# Patient Record
Sex: MALE | Race: BLACK OR AFRICAN AMERICAN
[De-identification: names, ages, dates, MRNs, and addresses within clinical notes are randomized per-mention and may not be internally consistent; named-entity substitution may affect disease eponyms.]

---
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
## 2018-02-17 ENCOUNTER — HOSPITAL ENCOUNTER (EMERGENCY)
Dept: HOSPITAL 62 - ER | Age: 18
Discharge: HOME | End: 2018-02-17
Payer: COMMERCIAL

## 2018-02-17 VITALS — SYSTOLIC BLOOD PRESSURE: 106 MMHG | DIASTOLIC BLOOD PRESSURE: 70 MMHG

## 2018-02-17 DIAGNOSIS — S63.91XA: Primary | ICD-10-CM

## 2018-02-17 DIAGNOSIS — S63.501A: ICD-10-CM
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
## 2018-02-17 PROCEDURE — 73130 X-RAY EXAM OF HAND: CPT

## 2018-02-17 PROCEDURE — 99283 EMERGENCY DEPT VISIT LOW MDM: CPT

## 2018-02-17 PROCEDURE — 73110 X-RAY EXAM OF WRIST: CPT

## 2018-02-17 PROCEDURE — L3908 WHO COCK-UP NONMOLDE PRE OTS: HCPCS

## 2018-02-17 NOTE — RADIOLOGY REPORT (SQ)
EXAM DESCRIPTION:  HAND RIGHT 3 VIEWS; WRIST RIGHT 3 VIEWS



COMPLETED DATE/TIME:  2/17/2018 7:12 pm



REASON FOR STUDY:  fell on roof, +wrist and hand pain



COMPARISON:  See below.



FINDINGS:  Three views right wrist:  2016 comparisons.  Normal alignment.  Normal bone density.

Three views right hand:  Soft tissue swelling distally along the 5th metacarpal.  No radiopaque forei
gn body or underlying fracture.  No subluxation or dislocation evident.



TECHNICAL DOCUMENTATION:  JOB ID:  [id number]

## 2018-02-17 NOTE — ER DOCUMENT REPORT
HPI





- HPI


Patient complains to provider of: Right hand injury


Onset: This afternoon


Onset/Duration: Sudden


Quality of pain: Achy


Pain Level: 5


Context: 





Patient states that he was working on a roof and fell off of the roof injuring 

his right hand.  Patient complains of pain over right fifth metacarpal and into 

the right lateral wrist area.  Patient is right-hand dominant.  Patient reports 

abrasions to the right hand.  Tetanus immunizations currently up-to-date.  

Patient denies any head injury, loss of consciousness, chest neck or back pain.


Associated Symptoms: Other - Right hand injury


Exacerbated by: Movement


Relieved by: Denies


Similar symptoms previously: No


Recently seen / treated by doctor: No





- ROS


ROS below otherwise negative: Yes


Systems Reviewed and Negative: Yes All other systems reviewed and negative





- NEURO


Neurology: DENIES: Headache





- CARDIOVASCULAR


Cardiovascular: DENIES: Chest pain





- RESPIRATORY


Respiratory: DENIES: Trouble Breathing





- GASTROINTESTINAL


Gastrointestinal: DENIES: Abdominal Pain, Nausea, Patient vomiting





- MUSCULOSKELETAL


Musculoskeletal: REPORTS: Extremity pain.  DENIES: Back Pain





- DERM


Skin Color: Normal


Skin Problems: Abrasion





Past Medical History





- General


Information source: Patient





- Social History


Smoking Status: Never Smoker


Frequency of alcohol use: None


Drug Abuse: None


Occupation: 


Lives with: Family


Family History: Reviewed & Not Pertinent





- Medical History


Medical History: Negative


Renal/ Medical History: Denies: Hx Peritoneal Dialysis


Surgical Hx: Negative





- Immunizations


Immunizations up to date: Yes


Hx Diphtheria, Pertussis, Tetanus Vaccination: Yes





Vertical Provider Document





- CONSTITUTIONAL


Agree With Documented VS: Yes


Exam Limitations: No Limitations


General Appearance: WD/WN, No Apparent Distress





- INFECTION CONTROL


TRAVEL OUTSIDE OF THE [place] IN LAST 30 DAYS: No





- HEENT


HEENT: Atraumatic, Normocephalic





- NECK


Neck: Normal Inspection





- RESPIRATORY


Respiratory: Breath Sounds Normal, No Respiratory Distress


O2 Sat by Pulse Oximetry: 99





- CARDIOVASCULAR


Cardiovascular: Regular Rate, Regular Rhythm


Pulses: Normal: Radial





- MUSCULOSKELETAL/EXTREMETIES


Musculoskeletal/Extremeties: MAEW, Tender - Right hand tenderness over right 

fifth metacarpal, right wrist tenderness to ulnar aspect of right wrist, no 

ecchymosis edema or deformity, No Edema.  negative: Eccymosis





- NEURO


Level of Consciousness: Awake, Alert, Appropriate


Motor/Sensory: No Motor Deficit





- DERM


Integumentary: Warm, Dry


Notes: 





Abrasions to dorsal aspect of right hand





Course





- Vital Signs


Vital signs: 


 











Temp Pulse Resp BP Pulse Ox


 


 98.8 F   82   18   107/72   99 


 


 02/17/18 18:22  02/17/18 18:22  02/17/18 18:22  02/17/18 18:22  02/17/18 18:22














- Diagnostic Test


Radiology reviewed: Image reviewed, Reports reviewed





Procedures





- Immobilization


  ** Right Hand


Pre-Proc Neuro Vasc Exam: Normal


Immobilizer type: Cock-up


Performed by: PCT


Post-Proc Neuro Vasc Exam: Normal


Alignment checked and good: Yes





Discharge





- Discharge


Clinical Impression: 


Hand sprain


Qualifiers:


 Encounter type: initial encounter Laterality: right Qualified Code(s): 

S63.91XA - Sprain of unspecified part of right wrist and hand, initial encounter





Wrist sprain


Qualifiers:


 Encounter type: initial encounter Laterality: right Qualified Code(s): 

S63.501A - Unspecified sprain of right wrist, initial encounter





Condition: Stable


Disposition: HOME, SELF-CARE


Instructions:  Ice & Elevation ([facility]), Sprain ([facility]), Wrist Sprain ([facility]), 

Temporary Splint ([facility])


Additional Instructions: 


Return immediately for any new or worsening symptoms





Followup with your primary care provider, call tomorrow to make a followup 

appointment








Prescriptions: 


Naproxen [Naprosyn 250 Nmg Tablet] 1 tab PO BID #14 tablet


Forms:  Return to Work


Referrals: 


MARIBETH BACK DO [Primary Care Provider] - Follow up as needed


[facility] [Provider Group] - Follow up as needed

## 2018-02-17 NOTE — RADIOLOGY REPORT (SQ)
EXAM DESCRIPTION:  HAND RIGHT 3 VIEWS; WRIST RIGHT 3 VIEWS



COMPLETED DATE/TIME:  2/17/2018 7:12 pm



REASON FOR STUDY:  fell on roof, +wrist and hand pain



COMPARISON:  See below.



FINDINGS:  Three views right wrist:  2016 comparisons.  Normal alignment.  Normal bone density.

Three views right hand:  Soft tissue swelling distally along the 5th metacarpal.  No radiopaque forei
gn body or underlying fracture.  No subluxation or dislocation evident.



TECHNICAL DOCUMENTATION:  JOB ID:  [id number]

## 2018-03-20 ENCOUNTER — HOSPITAL ENCOUNTER (EMERGENCY)
Dept: HOSPITAL 62 - ER | Age: 18
Discharge: HOME | End: 2018-03-20
Payer: SELF-PAY

## 2018-03-20 VITALS — DIASTOLIC BLOOD PRESSURE: 65 MMHG | SYSTOLIC BLOOD PRESSURE: 123 MMHG
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
## 2018-03-20 DIAGNOSIS — S60.151A: Primary | ICD-10-CM

## 2018-03-20 DIAGNOSIS — F17.210: ICD-10-CM

## 2018-03-20 PROCEDURE — 99406 BEHAV CHNG SMOKING 3-10 MIN: CPT

## 2018-03-20 PROCEDURE — 99283 EMERGENCY DEPT VISIT LOW MDM: CPT

## 2018-03-20 NOTE — RADIOLOGY REPORT (SQ)
EXAM DESCRIPTION:  HAND RIGHT 3 VIEWS



COMPLETED DATE/TIME:  3/20/2018 6:14 pm



REASON FOR STUDY:  finger pain



COMPARISON:  2/17/2018



EXAM PARAMETERS:  NUMBER OF VIEWS: Three views.

TECHNIQUE: AP, lateral and oblique  radiographic images acquired of the right hand.

LIMITATIONS: None.



FINDINGS:  MINERALIZATION: Normal.

BONES: No acute fracture or dislocation.  No worrisome bone lesions.

JOINTS: No effusions.

SOFT TISSUES: There is soft tissue swelling over the 5th metacarpophalangeal joint.  There are no ass
ociated calcifications or joint erosions.

OTHER: No other significant finding.



IMPRESSION:  Soft tissue swelling as described.  Is there any clinical evidence of gout?



TECHNICAL DOCUMENTATION:  JOB ID:  [id number]

 2011 [email or web address]- All Rights Reserved



Reading location - IP/workstation name: [email or web address]

## 2018-03-20 NOTE — ER DOCUMENT REPORT
ED Hand/Wrist Injury





- General


Chief Complaint: Finger Injury


Stated Complaint: FINGER INJURY


Time Seen by Provider: 03/20/18 18:24


Mode of Arrival: Ambulatory


Information source: Patient


Notes: 





16-year-old male presents to ED for complaint of pain to the right fifth 

finger.  He states he punched a wall yesterday.  He states he fell off a roof 

on 2/17/18.


TRAVEL OUTSIDE OF THE [place] IN LAST 30 DAYS: No





- HPI


Injury to: Small finger


Onset: Yesterday


Where: Home, Indoors


Timing: Still present


Quality of pain: Achy, Throbbing


Severity: Moderate


Pain Level: 4


Context: Other - Punched a wall





- Related Data


Allergies/Adverse Reactions: 


 





No Known Allergies Allergy (Verified 03/20/18 17:44)


 











Past Medical History





- General


Information source: Patient





- Social History


Smoking Status: Current Every Day Smoker


Cigarette use (# per day): Yes - 2 cigarettes a day


Chew tobacco use (# tins/day): No


Smoking Education Provided: Yes - 4 minutes


Frequency of alcohol use: None


Drug Abuse: None


Occupation: 


Lives with: Spouse/Significant other


Family History: Reviewed & Not Pertinent.  denies: Arthritis, CAD, COPD, CVA, DM

, Hyperlipidemia, Hypertension, Malignancy, Thyroid Disfunction


Patient has suicidal ideation: No


Patient has homicidal ideation: No





- Past Medical History


Cardiac Medical History: Reports: None


Pulmonary Medical History: Reports: None


EENT Medical History: Reports: None


Neurological Medical History: Reports: None


Endocrine Medical History: Reports: None


Renal/ Medical History: Reports: None


Malignancy Medical History: Reports None


GI Medical History: Reports: None


Musculoskeltal Medical History: Reports Hx Musculoskeletal Trauma


Skin Medical History: Reports None


Psychiatric Medical History: Reports: None


Traumatic Medical History: Reports: None


Infectious Medical History: Reports: None


Surgical Hx: Negative


Past Surgical History: Reports: None





- Immunizations


Immunizations up to date: Yes


Hx Diphtheria, Pertussis, Tetanus Vaccination: Yes





Review of Systems





- Review of Systems


Constitutional: No symptoms reported


EENT: No symptoms reported


Cardiovascular: No symptoms reported


Respiratory: No symptoms reported


Gastrointestinal: No symptoms reported


Genitourinary: No symptoms reported


Male Genitourinary: No symptoms reported


Musculoskeletal: Other - Right fifth finger pain swelling


Skin: No symptoms reported


Hematologic/Lymphatic: No symptoms reported


Neurological/Psychological: No symptoms reported


-: Yes All other systems reviewed and negative





Physical Exam





- Vital signs


Vitals: 


 











Temp Pulse Resp BP Pulse Ox


 


 98.7 F   74   14   123/65   99 


 


 03/20/18 17:55  03/20/18 17:55  03/20/18 17:55  03/20/18 17:55  03/20/18 17:55











Interpretation: Normal





- General


General appearance: Appears well, Alert





- HEENT


Head: Normocephalic, Atraumatic


Eyes: Normal


Pupils: PERRL





- Respiratory


Respiratory status: No respiratory distress


Chest status: Nontender


Breath sounds: Normal


Chest palpation: Normal





- Cardiovascular


Rhythm: Regular


Heart sounds: Normal auscultation


Murmur: No





- Abdominal


Inspection: Normal


Distension: No distension


Bowel sounds: Normal


Tenderness: Nontender


Organomegaly: No organomegaly





- Back


Back: Normal, Nontender





- Extremities


General upper extremity: Normal color, Normal temperature


General lower extremity: Normal inspection, Nontender, Normal color, Normal ROM

, Normal temperature, Normal weight bearing.  No: Jonathan's sign


Hand: Tender, Ecchymosis, No evidence of human bite, No evidence of FB - Right 

fifth finger, Swelling.  No: Abrasion, Deformity, Dislocation, Instability, 

Laceration





- Neurological


Neuro grossly intact: Yes


Cognition: Normal


Orientation: AAOx4


Monroe Coma Scale Eye Opening: Spontaneous


Michele Coma Scale Verbal: Oriented


Monroe Coma Scale Motor: Obeys Commands


Monroe Coma Scale Total: 15


Speech: Normal


Motor strength normal: LUE, RUE, LLE, RLE


Sensory: Normal





- Psychological


Associated symptoms: Normal affect, Normal mood





- Skin


Skin Temperature: Warm


Skin Moisture: Dry


Skin Color: Normal





Course





- Vital Signs


Vital signs: 


 











Temp Pulse Resp BP Pulse Ox


 


 98.7 F   74   14   123/65   99 


 


 03/20/18 17:55  03/20/18 17:55  03/20/18 17:55  03/20/18 17:55  03/20/18 17:55














- Diagnostic Test


Radiology reviewed: Image reviewed, Reports reviewed





Discharge





- Discharge


Clinical Impression: 


Contusion of right little finger with damage to nail


Qualifiers:


 Encounter type: initial encounter Qualified Code(s): S60.151A - Contusion of 

right little finger with damage to nail, initial encounter





Condition: Stable


Disposition: HOME, SELF-CARE


Instructions:  Family Physicians / Practices


Additional Instructions: 


CONTUSION:


    Your injury has resulted in a contusion -- a crushing of the deep tissues.  

No injury to important structures was detected during the physician's exam.  

Contusions vary in the amount of pain they cause, and in the length of time 

required for healing.  Typically, the area will become bruised, and will remain 

painful to touch for two or three weeks.  However, most patients are back to 

working and playing within a few days.


     After the initial period of rest and cold-packs, your symptoms (together 

with the doctor's recommendations) will determine how rapidly you can get back 

to full activity.  Usually this means "do what feels okay, but don't do things 

that hurt."


     If re-examination was recommended, it's important to follow up as 

instructed.  Call the doctor or return any time if pain increases, if swelling 

becomes severe, if you develop numbness or weakness in an injured extremity, or 

if any other alarming symptoms occur.








USE OF TYLENOL (ACETAMINOPHEN):


     Acetaminophen may be taken for pain relief or fever control. It's much 

safer than aspirin, offering a wider range of "safe" dosages.  It is safe 

during pregnancy.  Some brand names are Tylenol, Panadol, Datril, Anacin 3, 

Tempra, and Liquiprin. Acetaminophen can be repeated every four hours.  The 

following are maximum recommended dosages:





WEIGHT         Dose             Drops                  Elixir        Chewable(

80mg)


(LBS.)                            drprs=droppers    tsp=teaspoon


6               40 mg            0.4 ml (1/2)


6-11            80 mg            0.8 ml (full)              tsp               

   1       tab


12-16         120 mg           1 1/2 drprs             3/4  tsp               1 

1/2  tabs


17-23         160 mg             2  drprs             1    tsp                 

  2       tabs


24-30         240 mg             3  drprs             1 1/2 tsp                

3       tabs


30-35         320 mg                                       2    tsp            

       4       tabs


36-41         360 mg                                       2 1/4   tsp         

     4 1/2 tabs


42-47         400 mg                                       2 1/2   tsp         

     5      tabs


48-53         480 mg                                       3    tsp            

       6      tabs


54-59         520 mg                                       3  1/4  tsp         

     6 1/2 tabs


60-64         560 mg                                       3  1/2  tsp         

     7      tabs 


65-70         600 mg                                       3  3/4  tsp         

     7 1/2 tabs


71-76         640 mg                                       4   tsp             

      8      tabs


77-82         720 mg                                       4 1/2   tsp         

    9      tabs


83-88         800 mg                                       5   tsp             

    10      tabs





>89 pounds or adults          650 mg to 900 mg





Acetaminophen can be repeated every four hours.  Maximum dose not to exceed 

4000 mg a day.





   These maximum recommended dosages are slightly higher than the dosages 

written on the product container, but these dosages are very safe and below the 

toxic dosage for acetaminophen.








ICE & ELEVATION:


     Apply ice packs frequently against the painful area.  Many different 

schedules are recommended, such as "20 minutes on, 20 minutes off" or "one hour 

ice, two hours rest."  If you need to work, you may need to go longer between 

ice treatments.  You should plan to have the area ice packed AT LEAST one-

fourth of the time.


     The ice should be applied over the wrap, tape, or splint, or over a layer 

of cloth -- not directly against the skin.  Some ice bags have a built-in cloth 

and can be put directly on the skin.


     Your injured part should be elevated as much as possible over the next 48 

hours.  Try to keep the injury above the level of the heart. Avoid use of the 

injured area.  Elevation and rest will decrease the swelling.








USE OF OVER-THE-COUNTER IBUPROFEN:


     Ibuprofen (Advil, Nuprin, Medipren, Motrin IB) is a medication for fever 

and pain control.  In addition, it has anti- inflammatory effects which may be 

beneficial, especially in the treatment of injuries.


     It's best to take ibuprofen with food.  Persons with ulcer disease or 

allergy to aspirin should notify their physician of this before taking 

ibuprofen.


     Ibuprofen can be given every four to six hours, for a total of four doses 

daily.


     Age              Pain or fever dose          Antiinflammatory dose


     6-8 yr              200 mg (1 tab)                200 mg (1 tab)


     9-11 yr             200 mg (1 tab)                200-400 mg (1-2 tab)


     11-14 yr            200-400 mg (1-2 tab)         400 mg (2 tab)


     15-adult            400 mg (2 tab)                600 mg (3 tab)








FOLLOW-UP CARE:


If you have been referred to a physician for follow-up care, call the physician

s office for an appointment as you were instructed or within the next two days.

  If you experience worsening or a significant change in your symptoms, notify 

the physician immediately or return to the Emergency Department at any time for 

re-evaluation.


Forms:  Smoking Cessation Education, Return to Work


Referrals: 


JUJU MATA,  [ACTIVE STAFF] - Follow up as needed